# Patient Record
Sex: FEMALE | Race: WHITE | URBAN - METROPOLITAN AREA
[De-identification: names, ages, dates, MRNs, and addresses within clinical notes are randomized per-mention and may not be internally consistent; named-entity substitution may affect disease eponyms.]

---

## 2017-03-31 ENCOUNTER — OFFICE VISIT (OUTPATIENT)
Dept: CARDIOLOGY CLINIC | Age: 52
End: 2017-03-31

## 2017-03-31 VITALS
HEIGHT: 66 IN | WEIGHT: 194 LBS | HEART RATE: 75 BPM | DIASTOLIC BLOOD PRESSURE: 74 MMHG | SYSTOLIC BLOOD PRESSURE: 129 MMHG | BODY MASS INDEX: 31.18 KG/M2

## 2017-03-31 DIAGNOSIS — J06.9 UPPER RESPIRATORY TRACT INFECTION, UNSPECIFIED TYPE: ICD-10-CM

## 2017-03-31 DIAGNOSIS — R00.2 PALPITATION: Primary | ICD-10-CM

## 2017-03-31 NOTE — PROGRESS NOTES
HISTORY OF PRESENT ILLNESS  Taqueria Benson is a 46 y.o. female. New Patient   The history is provided by the patient. This is a new problem. The problem has not changed since onset. Associated symptoms include shortness of breath. Pertinent negatives include no chest pain, no abdominal pain and no headaches. Palpitations    The history is provided by the patient. This is a new problem. The current episode started more than 1 week ago. The problem has been gradually worsening. The problem occurs every several days. The problem is associated with nothing. Associated symptoms include diaphoresis, weakness and shortness of breath. Pertinent negatives include no fever, no chest pain, no claudication, no orthopnea, no PND, no abdominal pain, no nausea, no vomiting, no headaches, no dizziness, no cough, no hemoptysis and no sputum production. Review of Systems   Constitutional: Positive for diaphoresis. Negative for chills and fever. HENT: Positive for congestion. Negative for nosebleeds. Eyes: Negative for blurred vision and double vision. Respiratory: Positive for shortness of breath. Negative for cough, hemoptysis, sputum production and wheezing. Cardiovascular: Positive for palpitations. Negative for chest pain, orthopnea, claudication, leg swelling and PND. Gastrointestinal: Negative for abdominal pain, heartburn, nausea and vomiting. Musculoskeletal: Negative for myalgias. Skin: Negative for rash. Neurological: Positive for weakness. Negative for dizziness and headaches. Endo/Heme/Allergies: Does not bruise/bleed easily. Family History   Problem Relation Age of Onset    Heart Attack Mother     Heart Attack Father        History reviewed. No pertinent past medical history. History reviewed. No pertinent surgical history.     Social History   Substance Use Topics    Smoking status: Never Smoker    Smokeless tobacco: Not on file    Alcohol use No       Not on File    Prior to Admission medications    Not on File         Visit Vitals    /74    Pulse 75    Ht 5' 6\" (1.676 m)    Wt 88 kg (194 lb)    BMI 31.31 kg/m2         Physical Exam   Constitutional: She is oriented to person, place, and time. She appears well-developed and well-nourished. HENT:   Head: Normocephalic and atraumatic. Eyes: Conjunctivae are normal.   Neck: Neck supple. No JVD present. No tracheal deviation present. No thyromegaly present. Cardiovascular: Normal rate and regular rhythm. PMI is not displaced. Exam reveals no gallop, no S3 and no decreased pulses. No murmur heard. Pulmonary/Chest: No respiratory distress. She has no wheezes. She has no rales. She exhibits no tenderness. Abdominal: Soft. There is no tenderness. Musculoskeletal: She exhibits no edema. Neurological: She is alert and oriented to person, place, and time. Skin: Skin is warm. Psychiatric: She has a normal mood and affect. Ms. Beverley Watts has a reminder for a \"due or due soon\" health maintenance. I have asked that she contact her primary care provider for follow-up on this health maintenance. I have personally reviewed patients ekg done at other facility. 3/2017  Sr,wnl-3/2017    Assessment         ICD-10-CM ICD-9-CM    1. Palpitation R00.2 785. 1 ECG,PT DEMAND EVENT,PRESYMPT MEMORY LOOP    frequent  ? arrythmia   2. Upper respiratory tract infection, unspecified type J06.9 465.9     today on z pack       There are no discontinued medications. Orders Placed This Encounter    ECG,PT DEMAND EVENT,PRESYMPT MEMORY LOOP       Follow-up Disposition:  Return for F/u after tests.

## 2017-03-31 NOTE — MR AVS SNAPSHOT
Visit Information Date & Time Provider Department Dept. Phone Encounter #  
 3/31/2017 11:30 AM Hayley Gordillo MD [facility] [phone] [id number] Follow-up Instructions Return for F/u after tests. Upcoming Health Maintenance Date Due DTaP/Tdap/Td series (1 - Tdap) 11/20/1986 PAP AKA CERVICAL CYTOLOGY 11/20/1986 BREAST CANCER SCRN MAMMOGRAM 11/20/2015 FOBT Q 1 YEAR AGE 50-75 11/20/2015 INFLUENZA AGE 9 TO ADULT 8/1/2016 Allergies as of 3/31/2017  Review Complete On: 3/31/2017 By: Hayley Gordillo MD  
 Not on File Current Immunizations  Never Reviewed No immunizations on file. Not reviewed this visit You Were Diagnosed With   
  
 Codes Comments Palpitation    -  Primary ICD-10-CM: R00.2 ICD-9-CM: 785.1 frequent 
? arrythmia Upper respiratory tract infection, unspecified type     ICD-10-CM: J06.9 ICD-9-CM: 465.9 today on z pack Vitals BP Pulse Height(growth percentile) Weight(growth percentile) BMI Smoking Status 129/74 75 5' 6\" (1.676 m) 194 lb (88 kg) 31.31 kg/m2 Never Smoker Vitals History BMI and BSA Data Body Mass Index Body Surface Area  
 31.31 kg/m 2 2.02 m 2 Your Updated Medication List  
  
Notice  As of 3/31/2017 12:02 PM  
 You have not been prescribed any medications. We Performed the Following   
 ECG,PT DEMAND EVENT,PRESYMPT MEMORY LOOP [18124 CPT(R)] Follow-up Instructions Return for F/u after tests. Introducing [facility]! Anup Browning introduces [employer] patient portal. Now you can access parts of your medical record, email your doctor's office, and request medication refills online. 1. In your internet browser, go to [email or web address] 2. Click on the First Time User? Click Here link in the Sign In box. You will see the New Member Sign Up page. 3. Enter your [employer] Access Code exactly as it appears below.  You will not need to use this code after youve completed the sign-up process. If you do not sign up before the expiration date, you must request a new code. · [employer] Access Code: [email or web address] Expires: 6/29/2017 12:02 PM 
 
4. Enter the last four digits of your Social Security Number (xxxx) and Date of Birth (mm/dd/yyyy) as indicated and click Submit. You will be taken to the next sign-up page. 5. Create a [employer] ID. This will be your [employer] login ID and cannot be changed, so think of one that is secure and easy to remember. 6. Create a [employer] password. You can change your password at any time. 7. Enter your Password Reset Question and Answer. This can be used at a later time if you forget your password. 8. Enter your e-mail address. You will receive e-mail notification when new information is available in [address]. 9. Click Sign Up. You can now view and download portions of your medical record. 10. Click the Download Summary menu link to download a portable copy of your medical information. If you have questions, please visit the Frequently Asked Questions section of the [employer] website. Remember, [employer] is NOT to be used for urgent needs. For medical emergencies, dial 911. Now available from your iPhone and Android! Please provide this summary of care documentation to your next provider. If you have any questions after today's visit, please call [phone].

## 2017-05-02 ENCOUNTER — OFFICE VISIT (OUTPATIENT)
Dept: CARDIOLOGY CLINIC | Age: 52
End: 2017-05-02

## 2017-05-02 VITALS
WEIGHT: 193 LBS | HEART RATE: 90 BPM | BODY MASS INDEX: 31.02 KG/M2 | HEIGHT: 66 IN | DIASTOLIC BLOOD PRESSURE: 76 MMHG | SYSTOLIC BLOOD PRESSURE: 126 MMHG

## 2017-05-02 DIAGNOSIS — R00.2 PALPITATION: Primary | ICD-10-CM

## 2017-05-02 NOTE — PROGRESS NOTES
HISTORY OF PRESENT ILLNESS  Irene Connelly is a 46 y.o. female. HPI Comments: Patient is here for follow up of diagnostic tests. Results will be discussed. Palpitations    The history is provided by the patient. This is a new problem. The current episode started more than 1 week ago. The problem has been gradually worsening. The problem occurs every several days. The problem is associated with nothing. Pertinent negatives include no diaphoresis, no fever, no chest pain, no claudication, no orthopnea, no PND, no abdominal pain, no nausea, no vomiting, no headaches, no dizziness, no weakness, no cough, no hemoptysis, no shortness of breath and no sputum production. Review of Systems   Constitutional: Negative for chills, diaphoresis and fever. HENT: Negative for congestion and nosebleeds. Eyes: Negative for blurred vision and double vision. Respiratory: Negative for cough, hemoptysis, sputum production, shortness of breath and wheezing. Cardiovascular: Positive for palpitations. Negative for chest pain, orthopnea, claudication, leg swelling and PND. Gastrointestinal: Negative for abdominal pain, heartburn, nausea and vomiting. Musculoskeletal: Negative for myalgias. Skin: Negative for rash. Neurological: Negative for dizziness, weakness and headaches. Endo/Heme/Allergies: Does not bruise/bleed easily. Family History   Problem Relation Age of Onset    Heart Attack Mother     Heart Attack Father        No past medical history on file. No past surgical history on file. Social History   Substance Use Topics    Smoking status: Never Smoker    Smokeless tobacco: Not on file    Alcohol use No       Not on File    Prior to Admission medications    Not on File         Visit Vitals    /76    Pulse 90    Ht 5' 6\" (1.676 m)    Wt 87.5 kg (193 lb)    BMI 31.15 kg/m2         Physical Exam   Constitutional: She is oriented to person, place, and time.  She appears well-developed and well-nourished. HENT:   Head: Normocephalic and atraumatic. Eyes: Conjunctivae are normal.   Neck: Neck supple. No JVD present. No tracheal deviation present. No thyromegaly present. Cardiovascular: Normal rate and regular rhythm. PMI is not displaced. Exam reveals no gallop, no S3 and no decreased pulses. No murmur heard. Pulmonary/Chest: No respiratory distress. She has no wheezes. She has no rales. She exhibits no tenderness. Abdominal: Soft. There is no tenderness. Musculoskeletal: She exhibits no edema. Neurological: She is alert and oriented to person, place, and time. Skin: Skin is warm. Psychiatric: She has a normal mood and affect. Ms. Mariela Oconnor has a reminder for a \"due or due soon\" health maintenance. I have asked that she contact her primary care provider for follow-up on this health maintenance. I have personally reviewed patients ekg done at other facility. 3/2017  Sr,wnl-3/2017  4/2017  sr-no arrythmia  Assessment         ICD-10-CM ICD-9-CM    1. Palpitation R00.2 785.1     sr on all event strips  feels stressed -likely cause       There are no discontinued medications. No orders of the defined types were placed in this encounter. Follow-up Disposition:  Return if symptoms worsen or fail to improve.

## 2017-05-02 NOTE — PROGRESS NOTES
1. Have you been to the ER, urgent care clinic since your last visit? Hospitalized since your last visit?    no    2. Have you seen or consulted any other health care providers outside of the [address] [place] [address] since your last visit? Include any pap smears or colon screening. No     3. Since your last visit, have you had any of the following symptoms? shortness of breath.

## 2022-03-19 PROBLEM — R00.2 PALPITATION: Status: ACTIVE | Noted: 2017-03-31

## 2022-03-20 PROBLEM — J06.9 UPPER RESPIRATORY TRACT INFECTION: Status: ACTIVE | Noted: 2017-03-31

## 2023-07-07 ENCOUNTER — TELEPHONE (OUTPATIENT)
Age: 58
End: 2023-07-07

## 2023-07-10 ENCOUNTER — OFFICE VISIT (OUTPATIENT)
Age: 58
End: 2023-07-10
Payer: COMMERCIAL

## 2023-07-10 VITALS
HEIGHT: 68 IN | SYSTOLIC BLOOD PRESSURE: 138 MMHG | BODY MASS INDEX: 28.79 KG/M2 | HEART RATE: 76 BPM | OXYGEN SATURATION: 99 % | DIASTOLIC BLOOD PRESSURE: 75 MMHG | WEIGHT: 190 LBS

## 2023-07-10 DIAGNOSIS — L08.9 INFECTED SEBACEOUS CYST: Primary | ICD-10-CM

## 2023-07-10 DIAGNOSIS — L72.3 INFECTED SEBACEOUS CYST: Primary | ICD-10-CM

## 2023-07-10 PROCEDURE — 99203 OFFICE O/P NEW LOW 30 MIN: CPT | Performed by: SURGERY

## 2023-07-10 RX ORDER — SULFAMETHOXAZOLE AND TRIMETHOPRIM 800; 160 MG/1; MG/1
1 TABLET ORAL 2 TIMES DAILY
Qty: 20 TABLET | Refills: 0 | Status: SHIPPED | OUTPATIENT
Start: 2023-07-10 | End: 2023-07-20

## 2023-07-10 NOTE — PROGRESS NOTES
Mague Reynolds is a 62 y.o. female (: 1965) presenting to address:    Chief Complaint   Patient presents with    New Patient     Mass on left lower back/referred by Dr. Oliver Olivares       Medication list and allergies have been reviewed with Mague Reynolds and updated as of today's date. I have gone over all Medical, Surgical and Social History with Mague Gail and updated/added the information accordingly.

## 2023-07-24 ENCOUNTER — OFFICE VISIT (OUTPATIENT)
Age: 58
End: 2023-07-24
Payer: COMMERCIAL

## 2023-07-24 VITALS
BODY MASS INDEX: 28.79 KG/M2 | DIASTOLIC BLOOD PRESSURE: 88 MMHG | SYSTOLIC BLOOD PRESSURE: 142 MMHG | HEART RATE: 71 BPM | WEIGHT: 190 LBS | HEIGHT: 68 IN | TEMPERATURE: 97.2 F | OXYGEN SATURATION: 98 %

## 2023-07-24 DIAGNOSIS — M79.89 SOFT TISSUE MASS: Primary | ICD-10-CM

## 2023-07-24 DIAGNOSIS — L08.9 INFECTED SEBACEOUS CYST: ICD-10-CM

## 2023-07-24 DIAGNOSIS — L72.3 INFECTED SEBACEOUS CYST: ICD-10-CM

## 2023-07-24 PROCEDURE — 99213 OFFICE O/P EST LOW 20 MIN: CPT | Performed by: SURGERY

## 2023-07-24 NOTE — PROGRESS NOTES
[place] is a 62 y.o. female (: 1965) presenting to address:    Chief Complaint   Patient presents with    2 Week Follow-Up     Soft tissue mass on left lower back       Medication list and allergies have been reviewed with [place] and updated as of today's date. I have gone over all Medical, Surgical and Social History with [place] and updated/added the information accordingly. 1. Have you been to the ER, Urgent Care or Hospitalized since your last visit? No          2. Have you followed up with your PCP or any other Physicians since your procedure/ last office visit?    No

## 2023-07-31 ENCOUNTER — OFFICE VISIT (OUTPATIENT)
Age: 58
End: 2023-07-31
Payer: COMMERCIAL

## 2023-07-31 VITALS
DIASTOLIC BLOOD PRESSURE: 89 MMHG | WEIGHT: 191 LBS | HEIGHT: 68 IN | SYSTOLIC BLOOD PRESSURE: 137 MMHG | BODY MASS INDEX: 28.95 KG/M2 | HEART RATE: 70 BPM | OXYGEN SATURATION: 99 %

## 2023-07-31 DIAGNOSIS — L72.3 INFECTED SEBACEOUS CYST: Primary | ICD-10-CM

## 2023-07-31 DIAGNOSIS — L08.9 INFECTED SEBACEOUS CYST: Primary | ICD-10-CM

## 2023-07-31 DIAGNOSIS — M79.89 SOFT TISSUE MASS: ICD-10-CM

## 2023-07-31 PROCEDURE — 99214 OFFICE O/P EST MOD 30 MIN: CPT | Performed by: SURGERY

## 2023-07-31 NOTE — PROGRESS NOTES
Matias Marcum is a 62 y.o. female (: 1965) presenting to address:    Chief Complaint   Patient presents with    Procedure     Excision of soft tissue mass on left lower back       Medication list and allergies have been reviewed with Matias Marcum and updated as of today's date. I have gone over all Medical, Surgical and Social History with Matias Marcum and updated/added the information accordingly. 1. Have you been to the ER, Urgent Care or Hospitalized since your last visit? No          2. Have you followed up with your PCP or any other Physicians since your procedure/ last office visit?    No
Patient tolerated procedure well. Pain level is 0 out of 10. Patient instructed to stop by office or call with any issues or concerns. Patient verbalized understanding.
measuring around 1 to 2 cm. Imaging and Lab Review:     CBC: No results found for: WBC, RBC, HGB, HCT, PLT  BMP: No results found for: GLU, NA, K, CL, CO2, BUN, CREA, CA  CMP:No results found for: GLU, NA, K, CL, CO2, BUN, CREA, CA, AGAP, TP, ALB, GLOB    No results found for this or any previous visit (from the past 24 hour(s)). images and reports reviewed    Assessment:   [place] is a 62 y.o. female presenting with a soft tissue mass located on the back consistent with a sebaceous cyst for which she would like it to be removed. Understand the potential risk and complication including bleeding and infection as well as recurrence of the mass.      Plan:     A consent was taken from the patient and a timeout was performed  Patient lidocaine was injected around the cyst and it was excised in elliptical fashion  It consistent with a sebaceous cyst so it was discarded  There was some bleeding from the edge of the skin for which I applied a gauze and I was able to stop the bleeding  I closed the wound with only 1 suture in the middle and then I placed gauze on top of it for pressure  We observe the patient for 10 minutes and she was doing very well  Discharge today    Please call me if you have any questions (cell phone: [phone])     Signed By: Paola Brownlee MD     July 31, 2023

## 2023-08-21 ENCOUNTER — OFFICE VISIT (OUTPATIENT)
Age: 58
End: 2023-08-21
Payer: COMMERCIAL

## 2023-08-21 VITALS
BODY MASS INDEX: 28.95 KG/M2 | WEIGHT: 191 LBS | OXYGEN SATURATION: 99 % | DIASTOLIC BLOOD PRESSURE: 88 MMHG | HEIGHT: 68 IN | SYSTOLIC BLOOD PRESSURE: 141 MMHG | HEART RATE: 69 BPM

## 2023-08-21 DIAGNOSIS — L72.3 INFECTED SEBACEOUS CYST: Primary | ICD-10-CM

## 2023-08-21 DIAGNOSIS — L08.9 INFECTED SEBACEOUS CYST: Primary | ICD-10-CM

## 2023-08-21 PROCEDURE — 99212 OFFICE O/P EST SF 10 MIN: CPT | Performed by: SURGERY

## 2023-08-21 NOTE — PROGRESS NOTES
Patient seen and examined. He is doing very well. Her wound is healing well. There is a Vicryl suture that I removed. Follow-up as needed.

## 2023-08-21 NOTE — PROGRESS NOTES
Yvonne Hamilton is a 62 y.o. female (: 1965) presenting to address:    Chief Complaint   Patient presents with    Post-Op Check     Excision of soft tissue mass of left lower back 23       Medication list and allergies have been reviewed with Yvonne Hamilton and updated as of today's date. I have gone over all Medical, Surgical and Social History with Yvonne Hamilton and updated/added the information accordingly. 1. Have you been to the ER, Urgent Care or Hospitalized since your last visit? No          2. Have you followed up with your PCP or any other Physicians since your procedure/ last office visit?    No